# Patient Record
Sex: MALE | Race: WHITE | NOT HISPANIC OR LATINO | Employment: FULL TIME | URBAN - METROPOLITAN AREA
[De-identification: names, ages, dates, MRNs, and addresses within clinical notes are randomized per-mention and may not be internally consistent; named-entity substitution may affect disease eponyms.]

---

## 2024-09-03 ENCOUNTER — APPOINTMENT (OUTPATIENT)
Dept: OTOLARYNGOLOGY | Facility: CLINIC | Age: 34
End: 2024-09-03
Payer: COMMERCIAL

## 2024-09-03 VITALS
WEIGHT: 165.6 LBS | DIASTOLIC BLOOD PRESSURE: 75 MMHG | HEIGHT: 72 IN | BODY MASS INDEX: 22.43 KG/M2 | TEMPERATURE: 98.8 F | SYSTOLIC BLOOD PRESSURE: 135 MMHG

## 2024-09-03 DIAGNOSIS — K13.79 MUCOCELE OF MOUTH: Primary | ICD-10-CM

## 2024-09-03 PROCEDURE — 3008F BODY MASS INDEX DOCD: CPT

## 2024-09-03 PROCEDURE — 1036F TOBACCO NON-USER: CPT

## 2024-09-03 PROCEDURE — 99203 OFFICE O/P NEW LOW 30 MIN: CPT

## 2024-09-03 NOTE — PROGRESS NOTES
Patient ID: Tai Chandler is a 34 y.o. male who presents for the evaluation of nodule in the mouth. They present as a referral from urgent care provider Dr. Dimas Snow.    PROVIDER IMPRESSIONS:  DIAGNOSES/PROBLEMS:  -Mucocele of the mouth    ASSESSMENT:   Tai Chandler is a pleasant 34 y.o. male who presents with symptoms of recurrent tender cyst/nodule on the upper right palate. 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 Exam today revealed a small smooth raised clear fluid-filled vesicle on the upper posterior right soft palate without surrounding erythema or purulence. Patient reassured that mucoceles  are, in the majority of cases, harmless but can be uncomfortable and bothersome depending on their size and location.     PLAN:  Patient counseled on measures to improve oral moisture and hygiene, which include brushing teeth at least 2 times per day, discontinue use of alcohol-based mouth wash and avoid cough drops with menthol/eucalyptus ingredients as this can exacerbate oral/throat dryness. Patient advised to increase oral moisture with sugar-free dry mouth lozenges such as the following: Xylimelts Dry Mouth, ACT dry mouth lozenges, TheraBreath dry mouth, and Thayers Lozenges.   Follow-up: Patient may schedule for follow-up as needed. Patient is agreeable to this plan, all questions were answered to patient's satisfaction.     Subjective   HPI: Tai Chandler is a 34 y.o. male who presents for evaluation of tonsil/throat problems that began 6 months ago. He reports experiencing a recurrent nodule on the roof of his mouth. States that the nodule intermittently presents for 1-2 days at a time. States that nodules appear as a small cyst/bubble that often pops and recurs within days/weeks. He reports that nodules appear in more than one location, but recently has recurred in the same  spot. He states that the nodule is typically tender and causes discomfort when moving his tongue with eating. Patient recently seen by urgent care provider on 8/23/24 for symptoms and advised to see ENT for evaluation. When asked about the presence of symptoms including throat pain, cough, globus sensation, muffled/hoarse voice, drooling, dyspnea/SOB, difficulty swallowing, painful swallowing, fever, rhinorrhea, or bad breathe, the patient admits to none. Patient rates current throat pain a 0/10 on numeric pain scale. Patient denies other symptoms of unintentional weight loss, night sweats, neck masses/swelling, or recent heartburn/reflux. The patient denies recent testing for strep/viral throat infections. The patient denies history of recurrent tonsil stones. The patient denies history of heartburn/reflux. When asked about any current/prior use of smoking or tobacco products, the patient admits to none. Patient denies past medical history of asthma, HSV/HPV infection, thyroid conditions, seasonal allergies. Denies history of laryngeal/neck conditions, trauma or surgeries. Other pertinent past medical history includes vitiligo.     PATIENT HISTORY:  No past medical history on file.   No past surgical history on file.   Not on File   No current outpatient medications on file.   Tobacco Use: Low Risk  (12/8/2022)    Received from [facility]    Patient History     Smoking Tobacco Use: Never     Smokeless Tobacco Use: Never     Passive Exposure: Never      Alcohol Use: Alcohol Misuse (12/6/2022)    Received from [facility]    AUDIT-C     Frequency of Alcohol Consumption: 2-4 times a month     Average Number of Drinks: 3 or 4     Frequency of Binge Drinking: Monthly      Social History     Substance and Sexual Activity   Drug Use Not on file        Review of Systems   All other systems negative.     Objective   There were no vitals taken for this visit.     PHYSICAL EXAM:  General appearance: Appears well,  well-nourished, well groomed. No acute distress.   Constitutional: No fever, chills, weight loss or weight gain.  Communication: Normal communication  Psychiatric: Oriented to person, place and time. Normal mood and affect.  Neurologic: Cranial nerves II-XII grossly intact and symmetric bilaterally.  Cardiovascular: Examination of peripheral vascular system shows no clubbing or cyanosis.  Respiratory: No respiratory distress increased work of breathing. Inspection of the chest with symmetric chest expansion and normal respiratory effort.  Skin: No head and neck rashes.  Head: Normocephalic. Atraumatic with no masses, lesions or scarring.  Face: Normal symmetry. No scars or deformities.  Eyes: Conjunctiva not edematous or erythematous. PERRLA  Neck: Supple and symmetric, trachea midline. Lymph nodes with no adenopathy.  Head: Normocephalic. Atraumatic with no masses, lesions or scarring.  Eyes: PERRL, EOMI, Conjunctiva is clear. No nystagmus.  Nose: External inspection of nose: No nasal lesions, lacerations or scars. No tenderness on frontal or maxillary sinus palpation. Anterior rhinoscopy with limited visualization past the inferior turbinates: Septum is deviated right.  No septal perforation or lesions. No septal hematoma/ seroma.  No signs of bleeding.  No evidence of intranasal polyps.  Inferior turbinates are hypertrophied.    Throat:  Floor of mouth is clear, no masses.  Tongue appears slightly dry, no lesions or masses. Gums, gingiva, buccal mucosa appear pink and moist, no lesions. Teeth are in intact.  No obvious dental infections.  Peritonsillar regions appear symmetric without swelling. Hard palate appear normal, no obvious cleft or lesions. Soft palate with a small raised clear fluid filled vesicle without purulence or surrounding erythema.  Uvula is midline.  Left Tonsil -- 2+, no exudates.  Right Tonsil -- 2+, no exudates.  Oropharynx: No lesions. Retropharyngeal wall is flat.  No postnasal  drip.  Salivary Glands: Symmetric bilaterally.  No palpable masses.  No evidence of acute infection or salivary stones.  TMJ: Normal, no trismus.  Right Ear: External inspection of ear with no deformity, scars, or masses. Mastoid is nontender. External auditory canal is clear. TM is intact with no sign of infection, effusion, or retraction. No perforation seen.   Left Ear: External inspection of ear with no deformity, scars, or masses. Mastoid is nontender. External auditory canal is clear. TM is intact with no sign of infection, effusion, or retraction. No perforation seen.         Jerica Melendez, APRN-CNP